# Patient Record
Sex: FEMALE | Race: WHITE | NOT HISPANIC OR LATINO | Employment: UNEMPLOYED | URBAN - METROPOLITAN AREA
[De-identification: names, ages, dates, MRNs, and addresses within clinical notes are randomized per-mention and may not be internally consistent; named-entity substitution may affect disease eponyms.]

---

## 2018-12-13 ENCOUNTER — TRANSFERRED RECORDS (OUTPATIENT)
Dept: MULTI SPECIALTY CLINIC | Facility: CLINIC | Age: 38
End: 2018-12-13

## 2018-12-13 LAB — PAP-ABSTRACT: NORMAL

## 2022-05-01 ENCOUNTER — HEALTH MAINTENANCE LETTER (OUTPATIENT)
Age: 42
End: 2022-05-01

## 2022-05-27 ENCOUNTER — MYC MEDICAL ADVICE (OUTPATIENT)
Dept: INTERNAL MEDICINE | Facility: CLINIC | Age: 42
End: 2022-05-27

## 2022-05-27 ENCOUNTER — VIRTUAL VISIT (OUTPATIENT)
Dept: INTERNAL MEDICINE | Facility: CLINIC | Age: 42
End: 2022-05-27
Payer: MEDICAID

## 2022-05-27 DIAGNOSIS — M35.00 SJOGREN'S SYNDROME WITHOUT EXTRAGLANDULAR INVOLVEMENT (H): ICD-10-CM

## 2022-05-27 DIAGNOSIS — K74.69 OTHER CIRRHOSIS OF LIVER (H): Primary | ICD-10-CM

## 2022-05-27 DIAGNOSIS — E03.9 ACQUIRED HYPOTHYROIDISM: ICD-10-CM

## 2022-05-27 DIAGNOSIS — E83.39 HYPOPHOSPHATEMIA: ICD-10-CM

## 2022-05-27 DIAGNOSIS — E83.39 HYPOPHOSPHATEMIA: Primary | ICD-10-CM

## 2022-05-27 PROCEDURE — 99203 OFFICE O/P NEW LOW 30 MIN: CPT | Mod: 95 | Performed by: INTERNAL MEDICINE
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
## 2022-05-29 ENCOUNTER — MYC MEDICAL ADVICE (OUTPATIENT)
Dept: INTERNAL MEDICINE | Facility: CLINIC | Age: 42
End: 2022-05-29
Payer: MEDICAID

## 2022-05-29 PROBLEM — K74.69 OTHER CIRRHOSIS OF LIVER (H): Status: ACTIVE | Noted: 2022-05-29

## 2022-05-29 PROBLEM — E83.39 HYPOPHOSPHATEMIA: Status: ACTIVE | Noted: 2022-05-29

## 2022-05-29 PROBLEM — E03.9 ACQUIRED HYPOTHYROIDISM: Status: ACTIVE | Noted: 2022-05-29

## 2022-05-29 RX ORDER — SODIUM PHOSPHATE, DIBASIC, ANHYDROUS, POTASSIUM PHOSPHATE, MONOBASIC, AND SODIUM PHOSPHATE, MONOBASIC, MONOHYDRATE 852; 155; 130 MG/1; MG/1; MG/1
TABLET, COATED ORAL
COMMUNITY
Start: 2022-05-29

## 2022-05-29 RX ORDER — LEVOTHYROXINE SODIUM 88 UG/1
88 TABLET ORAL DAILY
COMMUNITY
Start: 2022-05-29 | End: 2022-06-01

## 2022-05-31 ENCOUNTER — MYC MEDICAL ADVICE (OUTPATIENT)
Dept: INTERNAL MEDICINE | Facility: CLINIC | Age: 42
End: 2022-05-31
Payer: MEDICAID

## 2022-05-31 NOTE — TELEPHONE ENCOUNTER
Please call the [place] lab per the attachment.   The order is for serum phosphorus level standing order, may be done twice a week.    Diagnosis is hypophosphatemia,I CD 10 code is E83.39.   Results should be faxed to our fax of here.     If they cannot accept a request through the nurse, then please try to get a fax number where we can fax an order.

## 2022-06-01 ENCOUNTER — TELEPHONE (OUTPATIENT)
Dept: NEPHROLOGY | Facility: CLINIC | Age: 42
End: 2022-06-01
Payer: MEDICAID

## 2022-06-01 ENCOUNTER — MYC MEDICAL ADVICE (OUTPATIENT)
Dept: INTERNAL MEDICINE | Facility: CLINIC | Age: 42
End: 2022-06-01
Payer: MEDICAID

## 2022-06-01 ENCOUNTER — TELEPHONE (OUTPATIENT)
Dept: GASTROENTEROLOGY | Facility: CLINIC | Age: 42
End: 2022-06-01
Payer: MEDICAID

## 2022-06-01 DIAGNOSIS — E03.9 ACQUIRED HYPOTHYROIDISM: Primary | ICD-10-CM

## 2022-06-01 DIAGNOSIS — K74.69 OTHER CIRRHOSIS OF LIVER (H): Primary | ICD-10-CM

## 2022-06-01 DIAGNOSIS — E83.39 HYPOPHOSPHATEMIA: Primary | ICD-10-CM

## 2022-06-01 NOTE — TELEPHONE ENCOUNTER
Health Call Center    Phone Message    May a detailed message be left on voicemail: yes     Reason for Call: Order(s): Other:   Reason for requested: Labs  Date needed: ASAP  Provider name: Dr. Hill    Patient would like labs send to [facility]. She will have labs done Friday 6/3. She provided numbers to call to send   [phone]      Action Taken: Message routed to:  [facility] Neph    Travel Screening: Not Applicable

## 2022-06-01 NOTE — TELEPHONE ENCOUNTER
See TuneIn Twitter Dashboardhart message. This is listed as Historical.     No results found for: TSH     She has TSH results through the [facility].

## 2022-06-01 NOTE — TELEPHONE ENCOUNTER
Abdomen , soft, nontender, nondistended , no guarding or rigidity , no masses palpable , normal bowel sounds , Liver and Spleen , no hepatomegaly present , no hepatosplenomegaly , liver nontender , spleen not palpable Lab orders faxed to [facility], [place] -  [phone].    Mylene SHIELDS LPN  Hepatology Clinic      [facility]    Phone Message    May a detailed message be left on voicemail: yes     Reason for Call: Order(s): Other:   Reason for requested: [facility]  Date needed: 07/01/2011  Provider name: Dr. Lanny Gonzales    New patient      Action Taken: Message routed to:  [facility] hep    Travel Screening: Not Applicable

## 2022-06-01 NOTE — TELEPHONE ENCOUNTER
RECORDS RECEIVED FROM: Internal   DATE RECEIVED: 06.06.2022    NOTES STATUS DETAILS   OFFICE NOTE from referring provider Internal 05.27.2022 Betsy Lerma MD   OFFICE NOTE from other specialist  Care Everywhere 04.19.2022 Arlette Soria M.D     03.30.2022 Saumya Landa APRN, C.N.P., D.N.P.     03.07.2022 Dami Ruth M.D.     More in Epic   *Only VASCULITIS or LUPUS gather office notes for the following     *PULMONARY       *ENT     *DERMATOLOGY     *RHEUMATOLOGY     DISCHARGE SUMMARY from hospital     DISCHARGE REPORT from the ER Care Everywhere 05.31.2022 Alex Loyd M.D.   MEDICATION LIST Care Everywhere / Internal    IMAGING  (NEED IMAGES AND REPORTS)     KIDNEY CT SCAN     KIDNEY ULTRASOUND     MR ABDOMEN     NUCLEAR MEDICINE RENAL     LABS     CBC Care Everywhere 05.31.2022   CMP Care Everywhere 05.31.2022   BMP Care Everywhere 05.31.2022   UA Care Everywhere 12.28.2021   URINE PROTEIN Care Everywhere 12.28.2021   RENAL PANEL     BIOPSY     KIDNEY BIOPSY

## 2022-06-01 NOTE — TELEPHONE ENCOUNTER
See her sophiat message and Care Everywhere. She was at the [facility] ED. Had  labs from [facility].

## 2022-06-02 RX ORDER — LEVOTHYROXINE SODIUM 88 UG/1
88 TABLET ORAL DAILY
Qty: 90 TABLET | Refills: 3 | Status: SHIPPED | OUTPATIENT
Start: 2022-06-02 | End: 2023-07-07

## 2022-06-04 ASSESSMENT — PAIN SCALES - GENERAL: PAINLEVEL: NO PAIN (0)

## 2022-06-06 ENCOUNTER — VIRTUAL VISIT (OUTPATIENT)
Dept: NEPHROLOGY | Facility: CLINIC | Age: 42
End: 2022-06-06
Attending: INTERNAL MEDICINE
Payer: MEDICAID

## 2022-06-06 ENCOUNTER — PRE VISIT (OUTPATIENT)
Dept: NEPHROLOGY | Facility: CLINIC | Age: 42
End: 2022-06-06
Payer: MEDICAID

## 2022-06-06 VITALS
WEIGHT: 222 LBS | HEIGHT: 66 IN | BODY MASS INDEX: 35.68 KG/M2 | SYSTOLIC BLOOD PRESSURE: 109 MMHG | DIASTOLIC BLOOD PRESSURE: 58 MMHG

## 2022-06-06 DIAGNOSIS — E87.6 HYPOKALEMIA: ICD-10-CM

## 2022-06-06 DIAGNOSIS — R79.89 ELEVATED PTHRP LEVEL: Primary | ICD-10-CM

## 2022-06-06 DIAGNOSIS — E83.39 HYPOPHOSPHATEMIA: ICD-10-CM

## 2022-06-06 PROCEDURE — 99205 OFFICE O/P NEW HI 60 MIN: CPT | Mod: 95 | Performed by: INTERNAL MEDICINE

## 2022-06-06 PROCEDURE — G0463 HOSPITAL OUTPT CLINIC VISIT: HCPCS | Mod: PN,RTG | Performed by: INTERNAL MEDICINE

## 2022-06-06 NOTE — PROGRESS NOTES
Colleen is a 42 year old who is being evaluated via a billable video visit.      How would you like to obtain your AVS? MyChart  If the video visit is dropped, the invitation should be resent by: Text to cell phone: [phone]  Will anyone else be joining your video visit? No    Video Start Time: 1:25 PM  Video-Visit Details    Type of service:  Video Visit    Video End Time:2:30    Originating Location (pt. Location): Home    Distant Location (provider location):  [facility] [place]     Platform used for Video Visit: Headplay

## 2022-06-06 NOTE — LETTER
6/6/2022       RE: Colleen Valencia  [address] [facility] [address]     Dear Colleague,    Thank you for referring your patient, Colleen Valencia, to the [facility] [place] [facility]. Please see a copy of my visit note below.    Colleen is a 42 year old who is being evaluated via a billable video visit.      How would you like to obtain your AVS? MyChart  If the video visit is dropped, the invitation should be resent by: Text to cell phone: [phone]  Will anyone else be joining your video visit? No    Video Start Time: 1:25 PM  Video-Visit Details    Type of service:  Video Visit    Video End Time:2:30    Originating Location (pt. Location): Home    Distant Location (provider location):  [facility] [place]     Platform used for Video Visit: [facility]      Nephrology Clinic Note  June 6, 2022    I was asked to see this patient by Dr. Lerma    CC: Hypophosphatemia    HPI: Colleen Valencia is a 42 year old female with PMH significant for liver cirrhosis, hypothyroidism, hypophosphatemia, sjoegren's syndrome who presents for evaluation and management of hypophosphatemia.     Pt endorsed feeling well today. No new symptoms. She is having trouble to keep up with phosphorus. Since that time she was on supplementation. She was evaluated by nephrology in [place] and recommended to continue the supplementation. This appointment has been made as she needed to understand better about why she needs phos supplementation.     Per her, her diet vary a lot as she order a lot from out side because she is often week to cook. If she cooks, she mostly uses fresh ingredients and denied any canned or processed food.    Last year march 2021, one side of her body was numb and went to ER, found out to have low phos and started on pohs supplementation.    - History of urinary symptoms: no  - History of Hematuria: no  - Swelling: no  -  "Hx of UTIs: no  - Hx of stones: no  - Rashes/Joint pain: no  - Family hx of kidney disease: no  - NSAID use: no       Allergies   Allergen Reactions     Amoxicillin      Cefzil      Flagyl [Metronidazole Hcl]      Penicillins      Sulfa Drugs        K-Phos-Neutral 155-852-130 MG tablet, 250 total in one tablet bid up to id  levothyroxine (SYNTHROID/LEVOTHROID) 88 MCG tablet, Take 1 tablet (88 mcg) by mouth daily    No current facility-administered medications on file prior to visit.      Past Medical History:   Diagnosis Date     History of blood transfusion 2009     Liver disease     \"fatty liver\" per pt     Sjoegren syndrome      Thyroid disease        Past Surgical History:   Procedure Laterality Date     c/s x3      4 cesearean sections      SECTION      x 4     ZZC  DELIVERY ONLY      Description:  Section;  Recorded: 10/15/2013;  Comments: x4       Social History     Tobacco Use     Smoking status: Former Smoker     Quit date: 2007     Years since quitting: 15.4     Smokeless tobacco: Never Used   Substance Use Topics     Alcohol use: No       Family History   Problem Relation Age of Onset     Arthritis Mother      Osteoarthritis Sister        ROS: A 12 system review of systems was negative other than noted here or above.     Exam:  /58   Ht 1.676 m (5' 6\")   Wt 100.7 kg (222 lb)   BMI 35.83 kg/m      GENERAL APPEARANCE: alert and no distress  EYES:  no scleral icterus  RESP: able to speak in full sentences, not in respiratory distress  CV: denied edema  SKIN: no visible facial rash  NEURO: mentation intact and speech normal  PSYCH: affect normal/bright    Results:    No visits with results within 1 Day(s) from this visit.   Latest known visit with results is:   Office Visit - [facility] on 10/30/2015   Component Date Value Ref Range Status     C4 Complement 10/30/2015 14 (A) 19 - 59 mg/dL Final     C3 Complement 10/30/2015 122  83 - 177 mg/dL Final     Complement, Total, S " 10/30/2015 63  30 - 75 U/mL Final    Comment:    Test Performed by:  [facility] - [address], [place] [address]  : Tanner Live II, M.D., Ph.D.       Immunoglobulin G 10/30/2015 1,653  700 - 1,700 mg/dL Final     Immunoglobulin M 10/30/2015 130  60 - 280 mg/dL Final     Immunoglobulin A 10/30/2015 309  65 - 400 mg/dL Final     DNA (ds) Antibody 10/30/2015 63 (A) <25 IU Final       Assessment/Plan:     Hypophosphatemia  Hypokalemia   Pt noted to have low phos in 2021 and since that time she was placed on replacement. Based on her diet history, was not able to pin out that it is decreased oral intake. She was hospitalized and got 24 hour urine excretion of phosphorus but collection was messed up so the values may not be accurate. Per pt, with regular diet but with out supplementation, the phos level use to go down quickly and eventually they had to keep her on supplementation to raise the level. No significant GI losses. Her PTH been elevated for quite some time now, not too high but above normal while susan levels are normal.  - will get US of parathyroid glands to rule out primary hyperparathyroidism which might be causing renal phosphate waste   - meanwhile recommended to continue on supplementation.  - will consider repeating 24 hour phos excretion if US is negative    Total time spent on the day of clinic visit was 70 min including 55 min of face to face time with pt, chart review, lab and image review, care every where lab review, documentation as above.    Lucero Hill  Dept of Nephrology and Hypertension  [facility]        Again, thank you for allowing me to participate in the care of your patient.      Sincerely,    Lucero Hill MD

## 2022-06-06 NOTE — PROGRESS NOTES
"  Nephrology Clinic Note  2022    I was asked to see this patient by Dr. Lerma    CC: Hypophosphatemia    HPI: Colleen Valencia is a 42 year old female with PMH significant for liver cirrhosis, hypothyroidism, hypophosphatemia, sjoegren's syndrome who presents for evaluation and management of hypophosphatemia.     Pt endorsed feeling well today. No new symptoms. She is having trouble to keep up with phosphorus. Since that time she was on supplementation. She was evaluated by nephrology in [place] and recommended to continue the supplementation. This appointment has been made as she needed to understand better about why she needs phos supplementation.     Per her, her diet vary a lot as she order a lot from out side because she is often week to cook. If she cooks, she mostly uses fresh ingredients and denied any canned or processed food.    Last year 2021, one side of her body was numb and went to ER, found out to have low phos and started on pohs supplementation.    - History of urinary symptoms: no  - History of Hematuria: no  - Swelling: no  - Hx of UTIs: no  - Hx of stones: no  - Rashes/Joint pain: no  - Family hx of kidney disease: no  - NSAID use: no       Allergies   Allergen Reactions     Amoxicillin      Cefzil      Flagyl [Metronidazole Hcl]      Penicillins      Sulfa Drugs        K-Phos-Neutral 155-852-130 MG tablet, 250 total in one tablet bid up to id  levothyroxine (SYNTHROID/LEVOTHROID) 88 MCG tablet, Take 1 tablet (88 mcg) by mouth daily    No current facility-administered medications on file prior to visit.      Past Medical History:   Diagnosis Date     History of blood transfusion      Liver disease     \"fatty liver\" per pt     Sjoegren syndrome      Thyroid disease        Past Surgical History:   Procedure Laterality Date     c/s x3      4 cesearean sections      SECTION      x 4     ZZC  DELIVERY ONLY      Description:  Section;  Recorded: 10/15/2013;  " "Comments: x4       Social History     Tobacco Use     Smoking status: Former Smoker     Quit date: 1/1/2007     Years since quitting: 15.4     Smokeless tobacco: Never Used   Substance Use Topics     Alcohol use: No       Family History   Problem Relation Age of Onset     Arthritis Mother      Osteoarthritis Sister        ROS: A 12 system review of systems was negative other than noted here or above.     Exam:  /58   Ht 1.676 m (5' 6\")   Wt 100.7 kg (222 lb)   BMI 35.83 kg/m      GENERAL APPEARANCE: alert and no distress  EYES:  no scleral icterus  RESP: able to speak in full sentences, not in respiratory distress  CV: denied edema  SKIN: no visible facial rash  NEURO: mentation intact and speech normal  PSYCH: affect normal/bright    Results:    No visits with results within 1 Day(s) from this visit.   Latest known visit with results is:   Office Visit - [facility] on 10/30/2015   Component Date Value Ref Range Status     C4 Complement 10/30/2015 14 (A) 19 - 59 mg/dL Final     C3 Complement 10/30/2015 122  83 - 177 mg/dL Final     Complement, Total, S 10/30/2015 63  30 - 75 U/mL Final    Comment:    Test Performed by:  [facility] [place] [address]  : Tanner Live II, M.D., Ph.D.       Immunoglobulin G 10/30/2015 1,653  700 - 1,700 mg/dL Final     Immunoglobulin M 10/30/2015 130  60 - 280 mg/dL Final     Immunoglobulin A 10/30/2015 309  65 - 400 mg/dL Final     DNA (ds) Antibody 10/30/2015 63 (A) <25 IU Final       Assessment/Plan:     Hypophosphatemia  Hypokalemia   Pt noted to have low phos in 2021 and since that time she was placed on replacement. Based on her diet history, was not able to pin out that it is decreased oral intake. She was hospitalized and got 24 hour urine excretion of phosphorus but collection was messed up so the values may not be accurate. Per pt, with regular diet but with out supplementation, the " phos level use to go down quickly and eventually they had to keep her on supplementation to raise the level. No significant GI losses. Her PTH been elevated for quite some time now, not too high but above normal while susan levels are normal.  - will get US of parathyroid glands to rule out primary hyperparathyroidism which might be causing renal phosphate waste   - meanwhile recommended to continue on supplementation.  - will consider repeating 24 hour phos excretion if US is negative    Total time spent on the day of clinic visit was 70 min including 55 min of face to face time with pt, chart review, lab and image review, care every where lab review, documentation as above.    Lucero Hill  Dept of Nephrology and Hypertension  [facility]

## 2022-06-07 NOTE — TELEPHONE ENCOUNTER
Call to lab. They are able to take an order over the phone and then would also need order faxed. Unable to only fax orders. States they do not do standing orders. Patient could have this done multiple times within the next 3 months but it would need to be scheduled. Please clarify how this is to be ordered.

## 2022-06-23 NOTE — TELEPHONE ENCOUNTER
RECORDS RECEIVED FROM: Internal   Appt Date: 07.08.2022   NOTES STATUS DETAILS   OFFICE NOTE from referring provider Internal 05.27.2022 Betsy Lerma MD   OFFICE NOTES from other specialists Care Everywhere 04.05.2022 Arlette Soria M.D.    03.08.2022 Yo Devine M.D.     DISCHARGE SUMMARY from hospital     MEDICATION LIST Internal / CE    LIVER BIOSPY (IF APPLICABLE)      PATHOLOGY REPORTS      IMAGING     ENDOSCOPY (IF AVAILABLE)     COLONOSCOPY (IF AVAILABLE)     ULTRASOUND LIVER     CT OF ABDOMEN     MRI OF LIVER     FIBROSCAN, US ELASTOGRAPHY, FIBROSIS SCAN, MR ELASTOGRAPHY     LABS     HEPATIC PANEL (LIVER PANEL) Care Everywhere 09.10.2021   BASIC METABOLIC PANEL Care Everywhere 05.31.2022   COMPLETE METABOLIC PANEL Care Everywhere 06.03.2022   COMPLETE BLOOD COUNT (CBC) Care Everywhere 06.03.2022   INTERNATIONAL NORMALIZED RATIO (INR)     HEPATITIS C ANTIBODY     HEPATITIS C VIRAL LOAD/PCR     HEPATITIS C GENOTYPE     HEPATITIS B SURFACE ANTIGEN Care Everywhere 11.09.2021   HEPATITIS B SURFACE ANTIBODY     HEPATITIS B DNA QUANT LEVEL     HEPATITIS B CORE ANTIBODY       Action 06.23.2022 RM   Action Taken

## 2022-07-01 ENCOUNTER — MYC MEDICAL ADVICE (OUTPATIENT)
Dept: INTERNAL MEDICINE | Facility: CLINIC | Age: 42
End: 2022-07-01

## 2022-07-07 NOTE — PROGRESS NOTES
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 Well compensated  Discussed NAFLD and fibrosis.  With weight loss, there is some ability to slow the progression for some patients. In addition, some fibrosis can regress in some patients.    Bone health Due for DEXA   HCC screening Due to US.   Variceal screening Recommendation reviewed.   She will pursue these through [facility] and let us know if we need to get an order there.    Labs and US every 6 mo. Return yearly.    Lanny Gonzales MD  Hepatology/Liver Transplant  Medical Director, Liver Transplantation  [facility]    Subjective  Colleen Valencia is a 42 Y F referred for NAFLD. Notes state she has a history of heavy drinking but she says this is not accurate. She drank during breaks in college but not otherwise. No ETOH for 20 y She has previously had care at [facility]. She has not had a liver biopsy. MRE showed fat fraction 60% with stage 3-4 fibrosis. MELD has been low at 7.    Labs 6/3/22 reviewed and normal.    HCV neg 2021  HBV neg 2021    HCC screening US 7/22/21  Variceal screening patient has declined  Iron panel nl    Risk factors for fatty liver disease: BMI 37, HLD, pre-diabetes  ETOH none for over 20 years  Past Medical History:   Diagnosis Date     History of blood transfusion 2009     Liver disease     \"fatty liver\" per pt     Sjoegren syndrome      Thyroid disease    Hypophosphatemia unknown cause and takes \[facility]\""   Social History     Socioeconomic History     Marital status:      Spouse name: Not on file     Number of children: Not on file     Years of education: Not on file     Highest education level: Not on file   Occupational History     Not on file   Tobacco Use     Smoking status: Former Smoker     Quit date: 1/1/2007     Years since quitting: 15.5     Smokeless tobacco: Never Used   Substance and Sexual Activity     Alcohol use: No     " Drug use: Not on file     Sexual activity: Not on file   Other Topics Concern     Not on file   Social History Narrative     Not on file     Social Determinants of Health     Financial Resource Strain: Not on file   Food Insecurity: Not on file   Transportation Needs: Not on file   Physical Activity: Not on file   Stress: Not on file   Social Connections: Not on file   Intimate Partner Violence: Not on file   Housing Stability: Not on file   4 children at home.     Family History   Problem Relation Age of Onset     Arthritis Mother      Osteoarthritis Sister    Dtr had fatty liver, She  in  in MVA.  Son has alopecisa. 13 in   ROS 10 point ROS neg other than the symptoms noted above in the HPI.  Exam  Gen Alert pleasant NAD  Resp No difficulty breathing. No cough  Skin No Jaundice  Eyes No icterus  Neuro RAMOS  MSK no muscle wasting  Psyche Pleasant, appropriate. Well groomed.  Colleen Valencia is a 42 year old female who is being evaluated via a billable video visit.    Video-Visit Details  Type of service:  Video Visit  Video Start Time: 1225  Video End Time: 1245  Originating Location (pt. Location):home  Distant Location (provider location):  [facility] [place]      Platform used for Video Visit: [employer] or [employer]

## 2022-07-08 ENCOUNTER — PRE VISIT (OUTPATIENT)
Dept: GASTROENTEROLOGY | Facility: CLINIC | Age: 42
End: 2022-07-08

## 2022-07-08 ENCOUNTER — VIRTUAL VISIT (OUTPATIENT)
Dept: GASTROENTEROLOGY | Facility: CLINIC | Age: 42
End: 2022-07-08
Attending: INTERNAL MEDICINE
Payer: MEDICAID

## 2022-07-08 DIAGNOSIS — K76.0 NAFLD (NONALCOHOLIC FATTY LIVER DISEASE): Primary | ICD-10-CM

## 2022-07-08 PROCEDURE — G0463 HOSPITAL OUTPT CLINIC VISIT: HCPCS | Mod: PN,RTG | Performed by: INTERNAL MEDICINE

## 2022-07-08 PROCEDURE — 99204 OFFICE O/P NEW MOD 45 MIN: CPT | Mod: 95 | Performed by: INTERNAL MEDICINE

## 2022-07-08 NOTE — LETTER
"    7/8/2022         RE: Colleen Valencia  [address] [facility] [address]        Dear Colleague,    Thank you for referring your patient, Colleen Valencia, to the [facility]PAT[facility] [place]. Please see a copy of my visit note below.    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 Well compensated  Discussed NAFLD and fibrosis.  With weight loss, there is some ability to slow the progression for some patients. In addition, some fibrosis can regress in some patients.    Bone health Due for DEXA   HCC screening Due to US.   Variceal screening Recommendation reviewed.   She will pursue these through [facility] and let us know if we need to get an order there.    Labs and US every 6 mo. Return yearly.    Lanny Gonzales MD  Hepatology/Liver Transplant  Medical Director, Liver Transplantation  [facility]  Colleen Valencia is a 42 Y F referred for NAFLD. Notes state she has a history of heavy drinking but she says this is not accurate. She drank during breaks in college but not otherwise. No ETOH for 20 y She has previously had care at [facility]. She has not had a liver biopsy. MRE showed fat fraction 60% with stage 3-4 fibrosis. MELD has been low at 7.    Labs 6/3/22 reviewed and normal.    HCV neg 2021  HBV neg 2021    HCC screening US 7/22/21  Variceal screening patient has declined  Iron panel nl    Risk factors for fatty liver disease: BMI 37, HLD, pre-diabetes  ETOH none for over 20 years  Past Medical History:   Diagnosis Date     History of blood transfusion 2009     Liver disease     \"fatty liver\" per pt     Sjoegren syndrome      Thyroid disease    Hypophosphatemia unknown cause and takes [facility]   Social History     Socioeconomic History     Marital status:      Spouse name: Not on file     Number of children: Not on file     Years of education: Not on file     Highest " education level: Not on file   Occupational History     Not on file   Tobacco Use     Smoking status: Former Smoker     Quit date: 2007     Years since quitting: 15.5     Smokeless tobacco: Never Used   Substance and Sexual Activity     Alcohol use: No     Drug use: Not on file     Sexual activity: Not on file   Other Topics Concern     Not on file   Social History Narrative     Not on file     Social Determinants of Health     Financial Resource Strain: Not on file   Food Insecurity: Not on file   Transportation Needs: Not on file   Physical Activity: Not on file   Stress: Not on file   Social Connections: Not on file   Intimate Partner Violence: Not on file   Housing Stability: Not on file   4 children at home.     Family History   Problem Relation Age of Onset     Arthritis Mother      Osteoarthritis Sister    Dtr had fatty liver, She  in  in MVA.  Son has alopecisa. 13 in   ROS 10 point ROS neg other than the symptoms noted above in the HPI.  Exam  Gen Alert pleasant NAD  Resp No difficulty breathing. No cough  Skin No Jaundice  Eyes No icterus  Neuro RAMOS  MSK no muscle wasting  Psyche Pleasant, appropriate. Well groomed.  Colleen Valencia is a 42 year old female who is being evaluated via a billable video visit.    Video-Visit Details  Type of service:  Video Visit  Video Start Time: 1225  Video End Time: 1245  Originating Location (pt. Location):home  Distant Location (provider location):  [facility]PAT[facility] [place]      Platform used for Video Visit: "[employer] or [employer]        Again, thank you for allowing me to participate in the care of your patient.        Sincerely,        Lanny Gonzales MD

## 2022-07-25 ENCOUNTER — TELEPHONE (OUTPATIENT)
Dept: GASTROENTEROLOGY | Facility: CLINIC | Age: 42
End: 2022-07-25

## 2022-07-25 DIAGNOSIS — K74.69 OTHER CIRRHOSIS OF LIVER (H): ICD-10-CM

## 2022-07-25 DIAGNOSIS — K76.0 NAFLD (NONALCOHOLIC FATTY LIVER DISEASE): Primary | ICD-10-CM

## 2022-07-25 NOTE — TELEPHONE ENCOUNTER
Returned patient's call. Let her know correction to video visit appointment will be made.  Orders for abdominal ultrasound and DEXA scan placed.  Patient will call and schedule.    No further questions or concerns.    [facility]    Phone Message    May a detailed message be left on voicemail: yes     Reason for Call: Other:     Pt is calling to ask that a correction be made to file, she stated that she did have a video visit on 7/7/2022 rather than on 7/8/2022.    She is also asking for orders for the ultrasound that was discusses in that visit.    Please call to discuss.    Action Taken: Message routed to:  [facility] hep    Travel Screening: Not Applicable

## 2022-08-12 ENCOUNTER — HOSPITAL ENCOUNTER (OUTPATIENT)
Dept: ULTRASOUND IMAGING | Facility: CLINIC | Age: 42
Discharge: HOME | End: 2022-08-12
Attending: INTERNAL MEDICINE
Payer: MEDICAID

## 2022-08-12 DIAGNOSIS — E83.39 HYPOPHOSPHATEMIA: ICD-10-CM

## 2022-08-12 DIAGNOSIS — K74.69 OTHER CIRRHOSIS OF LIVER (H): ICD-10-CM

## 2022-08-12 DIAGNOSIS — K76.0 NAFLD (NONALCOHOLIC FATTY LIVER DISEASE): ICD-10-CM

## 2022-08-12 PROCEDURE — 76536 US EXAM OF HEAD AND NECK: CPT

## 2022-08-12 PROCEDURE — 76705 ECHO EXAM OF ABDOMEN: CPT

## 2022-08-15 DIAGNOSIS — K74.69 OTHER CIRRHOSIS OF LIVER (H): ICD-10-CM

## 2022-08-15 DIAGNOSIS — K76.0 NAFLD (NONALCOHOLIC FATTY LIVER DISEASE): Primary | ICD-10-CM

## 2022-09-08 DIAGNOSIS — E87.6 HYPOKALEMIA: Primary | ICD-10-CM

## 2022-09-08 DIAGNOSIS — E83.39 HYPOPHOSPHATEMIA: ICD-10-CM

## 2022-09-09 ENCOUNTER — TELEPHONE (OUTPATIENT)
Dept: NEPHROLOGY | Facility: CLINIC | Age: 42
End: 2022-09-09

## 2022-09-09 NOTE — TELEPHONE ENCOUNTER
[facility]    Phone Message    May a detailed message be left on voicemail: yes     Reason for Call: Ronnie would like to speak with clinic in regards to a labs referral sent to them. Please reach out to Ronnie.    Action Taken: Message routed to:  [facility] Nephrology    Travel Screening: Not Applicable

## 2022-09-09 NOTE — TELEPHONE ENCOUNTER
Call to Ronnie. Clarification on CBC with platelets (no diff) and urine protein with creatinine ratio  Theodora Garvin LPN  Nephrology  [phone]

## 2022-09-12 ENCOUNTER — VIRTUAL VISIT (OUTPATIENT)
Dept: NEPHROLOGY | Facility: CLINIC | Age: 42
End: 2022-09-12
Attending: INTERNAL MEDICINE
Payer: MEDICAID

## 2022-09-12 DIAGNOSIS — E21.3 HYPERPARATHYROIDISM (H): Primary | ICD-10-CM

## 2022-09-12 DIAGNOSIS — R53.83 OTHER FATIGUE: ICD-10-CM

## 2022-09-12 DIAGNOSIS — E83.39 HYPOPHOSPHATEMIA: ICD-10-CM

## 2022-09-12 DIAGNOSIS — E87.6 HYPOKALEMIA: ICD-10-CM

## 2022-09-12 PROCEDURE — G0463 HOSPITAL OUTPT CLINIC VISIT: HCPCS | Mod: PN,RTG | Performed by: INTERNAL MEDICINE

## 2022-09-12 PROCEDURE — 99214 OFFICE O/P EST MOD 30 MIN: CPT | Mod: 95 | Performed by: INTERNAL MEDICINE

## 2022-09-12 NOTE — PROGRESS NOTES
Colleen is a 42 year old who is being evaluated via a billable video visit.      How would you like to obtain your AVS? MyChart  If the video visit is dropped, the invitation should be resent by: Text to cell phone: [phone]  Will anyone else be joining your video visit? No        Video-Visit Details    Video Start Time: 12:58 PM    Type of service:  Video Visit    Video End Time:1:18 PM    Originating Location (pt. Location): Home    Distant Location (provider location):  [facility]PHROLOG[facility] [place]     Platform used for Video Visit: [facility]       Nephrology Clinic Note  September 12, 2022    I was asked to see this patient by Dr. Lerma     CC: Hypophosphatemia     HPI: Colleen Valencia is a 42 year old female with PMH significant for liver cirrhosis, hypothyroidism, hypophosphatemia, sjoegren's syndrome who presents for evaluation and management of hypophosphatemia.     Pt presented to video visit for electrolyte management. Pt endorsed that, she tried titrating down her doses of electrolyte supplementation for a week and noted her symptoms including fatigue was profound after a week of discontinuation of her supplementation. She restarted her supplements with improvement in her symptoms. Denied other systemic symptoms today.    Pt endorsed that, she had her labs, unfortunately they were not resulted in care every where records.    - History of urinary symptoms: no  - History of Hematuria: no  - Swelling: no  - Hx of UTIs: no  - Hx of stones: no  - Rashes/Joint pain: no  - Family hx of kidney disease: no  - NSAID use: no        Allergies   Allergen Reactions     Amoxicillin      Cefzil      Flagyl [Metronidazole Hcl]      Penicillins      Sulfa Drugs        K-Phos-Neutral 155-852-130 MG tablet, 250 total in one tablet bid up to id  levothyroxine (SYNTHROID/LEVOTHROID) 88 MCG tablet, Take 1 tablet (88 mcg) by mouth daily    No current facility-administered medications on file prior to  "visit.      Past Medical History:   Diagnosis Date     History of blood transfusion 2009     Liver disease     \"fatty liver\" per pt     Sjoegren syndrome      Thyroid disease        Past Surgical History:   Procedure Laterality Date     c/s x3      4 cesearean sections      SECTION      x 4     ZZC  DELIVERY ONLY      Description:  Section;  Recorded: 10/15/2013;  Comments: x4       Social History     Tobacco Use     Smoking status: Former Smoker     Quit date: 2007     Years since quitting: 15.7     Smokeless tobacco: Never Used   Substance Use Topics     Alcohol use: No       Family History   Problem Relation Age of Onset     Arthritis Mother      Osteoarthritis Sister        ROS: A 12 system review of systems was negative other than noted here or above.     Exam:  There were no vitals taken for this visit.    GENERAL APPEARANCE: alert and no distress  EYES:  no scleral icterus  HENT: no gross abnormality noted  RESP: able to speak in full sentences, not in respiratory distress, no audible wheezing noted   CV: denied edema  SKIN: no visible facial rash  NEURO: mentation intact and speech normal  PSYCH: affect normal/bright      Results:    No visits with results within 1 Day(s) from this visit.   Latest known visit with results is:   Office Visit - [facility] on 10/30/2015   Component Date Value Ref Range Status     C4 Complement 10/30/2015 14 (A) 19 - 59 mg/dL Final     C3 Complement 10/30/2015 122  83 - 177 mg/dL Final     Complement, Total, S 10/30/2015 63  30 - 75 U/mL Final    Comment:    Test Performed by:  [place] [address]  : Tanner Live II, M.D., Ph.D.       Immunoglobulin G 10/30/2015 1,653  700 - 1,700 mg/dL Final     Immunoglobulin M 10/30/2015 130  60 - 280 mg/dL Final     Immunoglobulin A 10/30/2015 309  65 - 400 mg/dL Final     DNA (ds) Antibody 10/30/2015 63 (A) <25 IU " Final       Assessment/Plan:     Hypophosphatemia  Hypokalemia   Pt noted to have low phos in 2021 and since that time she was placed on replacement. Based on her diet history, was not able to pin out that it is decreased oral intake. She was hospitalized and got 24 hour urine excretion of phosphorus but collection was messed up so the values may not be accurate. Per pt, with regular diet but with out supplementation, the phos level use to go down quickly and eventually they had to keep her on supplementation to raise the level. No significant GI losses. Her PTH been elevated for quite some time now, not too high but above normal while susan levels are normal.  - parathyroid US with out adenoma or hypertrophy   - Recommended to continue on supplementation with out breakage.  - will consider repeating 24 hour phos excretion during next visit.     Total time spent on the day of clinic visit was 30 min including 20 min of face to face time with pt, chart review, lab and image review, care every where lab review, documentation as above.     Lucero Hill  Dept of Nephrology and Hypertension  [facility]

## 2022-09-12 NOTE — LETTER
"9/12/2022       RE: Colleen Valencia  [address] [facility] [address]     Dear Colleague,    Thank you for referring your patient, Colleen Valencia, to the [facility] [place] at [facility]. Please see a copy of my visit note below.      Nephrology Clinic Note  September 12, 2022    I was asked to see this patient by Dr. Lerma     CC: Hypophosphatemia     HPI: Colleen Valencia is a 42 year old female with PMH significant for liver cirrhosis, hypothyroidism, hypophosphatemia, sjoegren's syndrome who presents for evaluation and management of hypophosphatemia.     Pt presented to video visit for electrolyte management. Pt endorsed that, she tried titrating down her doses of electrolyte supplementation for a week and noted her symptoms including fatigue was profound after a week of discontinuation of her supplementation. She restarted her supplements with improvement in her symptoms. Denied other systemic symptoms today.    Pt endorsed that, she had her labs, unfortunately they were not resulted in care every where records.    - History of urinary symptoms: no  - History of Hematuria: no  - Swelling: no  - Hx of UTIs: no  - Hx of stones: no  - Rashes/Joint pain: no  - Family hx of kidney disease: no  - NSAID use: no        Allergies   Allergen Reactions     Amoxicillin      Cefzil      Flagyl [Metronidazole Hcl]      Penicillins      Sulfa Drugs        K-Phos-Neutral 155-852-130 MG tablet, 250 total in one tablet bid up to id  levothyroxine (SYNTHROID/LEVOTHROID) 88 MCG tablet, Take 1 tablet (88 mcg) by mouth daily    No current facility-administered medications on file prior to visit.      Past Medical History:   Diagnosis Date     History of blood transfusion 2009     Liver disease     \"fatty liver\" per pt     Sjoegren syndrome      Thyroid disease        Past Surgical History:   Procedure Laterality Date     c/s x3      4 cesearean sections "      SECTION      x 4     ZZC  DELIVERY ONLY      Description:  Section;  Recorded: 10/15/2013;  Comments: x4       Social History     Tobacco Use     Smoking status: Former Smoker     Quit date: 2007     Years since quitting: 15.7     Smokeless tobacco: Never Used   Substance Use Topics     Alcohol use: No       Family History   Problem Relation Age of Onset     Arthritis Mother      Osteoarthritis Sister        ROS: A 12 system review of systems was negative other than noted here or above.     Exam:  There were no vitals taken for this visit.    GENERAL APPEARANCE: alert and no distress  EYES:  no scleral icterus  HENT: no gross abnormality noted  RESP: able to speak in full sentences, not in respiratory distress, no audible wheezing noted   CV: denied edema  SKIN: no visible facial rash  NEURO: mentation intact and speech normal  PSYCH: affect normal/bright      Results:    No visits with results within 1 Day(s) from this visit.   Latest known visit with results is:   Office Visit - [facility] on 10/30/2015   Component Date Value Ref Range Status     C4 Complement 10/30/2015 14 (A) 19 - 59 mg/dL Final     C3 Complement 10/30/2015 122  83 - 177 mg/dL Final     Complement, Total, S 10/30/2015 63  30 - 75 U/mL Final    Comment:    Test Performed by:  [facility] [place] [address]  : Tanner Live II, M.D., Ph.D.       Immunoglobulin G 10/30/2015 1,653  700 - 1,700 mg/dL Final     Immunoglobulin M 10/30/2015 130  60 - 280 mg/dL Final     Immunoglobulin A 10/30/2015 309  65 - 400 mg/dL Final     DNA (ds) Antibody 10/30/2015 63 (A) <25 IU Final       Assessment/Plan:     Hypophosphatemia  Hypokalemia   Pt noted to have low phos in  and since that time she was placed on replacement. Based on her diet history, was not able to pin out that it is decreased oral intake. She was hospitalized and got 24 hour  urine excretion of phosphorus but collection was messed up so the values may not be accurate. Per pt, with regular diet but with out supplementation, the phos level use to go down quickly and eventually they had to keep her on supplementation to raise the level. No significant GI losses. Her PTH been elevated for quite some time now, not too high but above normal while susan levels are normal.  - parathyroid US with out adenoma or hypertrophy   - Recommended to continue on supplementation with out breakage.  - will consider repeating 24 hour phos excretion during next visit.     Total time spent on the day of clinic visit was 30 min including 20 min of face to face time with pt, chart review, lab and image review, care every where lab review, documentation as above.     Lucero Hill  Dept of Nephrology and Hypertension  [facility]

## 2022-09-16 ENCOUNTER — TELEPHONE (OUTPATIENT)
Dept: MULTI SPECIALTY CLINIC | Facility: CLINIC | Age: 42
End: 2022-09-16

## 2022-12-25 ENCOUNTER — HEALTH MAINTENANCE LETTER (OUTPATIENT)
Age: 42
End: 2022-12-25

## 2023-06-02 ENCOUNTER — HEALTH MAINTENANCE LETTER (OUTPATIENT)
Age: 43
End: 2023-06-02

## 2023-07-05 ENCOUNTER — OFFICE VISIT (OUTPATIENT)
Dept: FAMILY MEDICINE | Facility: CLINIC | Age: 43
End: 2023-07-05
Payer: MEDICAID

## 2023-07-05 VITALS
RESPIRATION RATE: 21 BRPM | BODY MASS INDEX: 33.59 KG/M2 | TEMPERATURE: 97.7 F | HEART RATE: 68 BPM | OXYGEN SATURATION: 99 % | SYSTOLIC BLOOD PRESSURE: 120 MMHG | HEIGHT: 66 IN | DIASTOLIC BLOOD PRESSURE: 82 MMHG | WEIGHT: 209 LBS

## 2023-07-05 DIAGNOSIS — K74.02 HEPATIC FIBROSIS, ADVANCED FIBROSIS: ICD-10-CM

## 2023-07-05 DIAGNOSIS — E03.9 ACQUIRED HYPOTHYROIDISM: ICD-10-CM

## 2023-07-05 DIAGNOSIS — K02.9 DENTAL CARIES: ICD-10-CM

## 2023-07-05 DIAGNOSIS — E83.39 HYPOPHOSPHATEMIA: Primary | ICD-10-CM

## 2023-07-05 LAB
ALBUMIN SERPL BCG-MCNC: 4.3 G/DL (ref 3.5–5.2)
ALP SERPL-CCNC: 104 U/L (ref 35–104)
ALT SERPL W P-5'-P-CCNC: 23 U/L (ref 0–50)
ANION GAP SERPL CALCULATED.3IONS-SCNC: 11 MMOL/L (ref 7–15)
AST SERPL W P-5'-P-CCNC: 56 U/L (ref 0–45)
BILIRUB DIRECT SERPL-MCNC: <0.2 MG/DL (ref 0–0.3)
BILIRUB SERPL-MCNC: 0.3 MG/DL
BUN SERPL-MCNC: 8.2 MG/DL (ref 6–20)
CALCIUM SERPL-MCNC: 9.3 MG/DL (ref 8.6–10)
CHLORIDE SERPL-SCNC: 104 MMOL/L (ref 98–107)
CREAT SERPL-MCNC: 0.71 MG/DL (ref 0.51–0.95)
DEPRECATED CALCIDIOL+CALCIFEROL SERPL-MC: 26 UG/L (ref 20–75)
DEPRECATED HCO3 PLAS-SCNC: 25 MMOL/L (ref 22–29)
ERYTHROCYTE [DISTWIDTH] IN BLOOD BY AUTOMATED COUNT: 15.3 % (ref 10–15)
FERRITIN SERPL-MCNC: 15 NG/ML (ref 6–175)
GFR SERPL CREATININE-BSD FRML MDRD: >90 ML/MIN/1.73M2
GLUCOSE SERPL-MCNC: 126 MG/DL (ref 70–99)
HCT VFR BLD AUTO: 41.2 % (ref 35–47)
HGB BLD-MCNC: 12.9 G/DL (ref 11.7–15.7)
IRON BINDING CAPACITY (ROCHE): 391 UG/DL (ref 240–430)
IRON SATN MFR SERPL: 10 % (ref 15–46)
IRON SERPL-MCNC: 41 UG/DL (ref 37–145)
MAGNESIUM SERPL-MCNC: 2.1 MG/DL (ref 1.7–2.3)
MCH RBC QN AUTO: 25.5 PG (ref 26.5–33)
MCHC RBC AUTO-ENTMCNC: 31.3 G/DL (ref 31.5–36.5)
MCV RBC AUTO: 82 FL (ref 78–100)
PHOSPHATE SERPL-MCNC: 2 MG/DL (ref 2.5–4.5)
PLATELET # BLD AUTO: 178 10E3/UL (ref 150–450)
POTASSIUM SERPL-SCNC: 3.6 MMOL/L (ref 3.4–5.3)
PROT SERPL-MCNC: 7.8 G/DL (ref 6.4–8.3)
PTH-INTACT SERPL-MCNC: 54 PG/ML (ref 15–65)
RBC # BLD AUTO: 5.05 10E6/UL (ref 3.8–5.2)
SODIUM SERPL-SCNC: 140 MMOL/L (ref 136–145)
TSH SERPL DL<=0.005 MIU/L-ACNC: 1.6 UIU/ML (ref 0.3–4.2)
VIT B12 SERPL-MCNC: 491 PG/ML (ref 232–1245)
WBC # BLD AUTO: 3.8 10E3/UL (ref 4–11)

## 2023-07-05 PROCEDURE — 99204 OFFICE O/P NEW MOD 45 MIN: CPT | Performed by: FAMILY MEDICINE

## 2023-07-05 PROCEDURE — 82248 BILIRUBIN DIRECT: CPT | Performed by: FAMILY MEDICINE

## 2023-07-05 PROCEDURE — 83550 IRON BINDING TEST: CPT | Performed by: FAMILY MEDICINE

## 2023-07-05 PROCEDURE — 83735 ASSAY OF MAGNESIUM: CPT | Performed by: FAMILY MEDICINE

## 2023-07-05 PROCEDURE — 36415 COLL VENOUS BLD VENIPUNCTURE: CPT | Performed by: FAMILY MEDICINE

## 2023-07-05 PROCEDURE — 82607 VITAMIN B-12: CPT | Performed by: FAMILY MEDICINE

## 2023-07-05 PROCEDURE — 82306 VITAMIN D 25 HYDROXY: CPT | Performed by: FAMILY MEDICINE

## 2023-07-05 PROCEDURE — 82728 ASSAY OF FERRITIN: CPT | Performed by: FAMILY MEDICINE

## 2023-07-05 PROCEDURE — 83540 ASSAY OF IRON: CPT | Performed by: FAMILY MEDICINE

## 2023-07-05 PROCEDURE — 84100 ASSAY OF PHOSPHORUS: CPT | Performed by: FAMILY MEDICINE

## 2023-07-05 PROCEDURE — 83970 ASSAY OF PARATHORMONE: CPT | Performed by: FAMILY MEDICINE

## 2023-07-05 PROCEDURE — 80053 COMPREHEN METABOLIC PANEL: CPT | Performed by: FAMILY MEDICINE

## 2023-07-05 PROCEDURE — 85027 COMPLETE CBC AUTOMATED: CPT | Performed by: FAMILY MEDICINE

## 2023-07-05 PROCEDURE — 84443 ASSAY THYROID STIM HORMONE: CPT | Performed by: FAMILY MEDICINE

## 2023-07-05 RX ORDER — CHLORHEXIDINE GLUCONATE ORAL RINSE 1.2 MG/ML
15 SOLUTION DENTAL 2 TIMES DAILY
Qty: 300 ML | Refills: 0 | Status: SHIPPED | OUTPATIENT
Start: 2023-07-05 | End: 2023-07-15

## 2023-07-05 RX ORDER — ALBUTEROL SULFATE 90 UG/1
2 AEROSOL, METERED RESPIRATORY (INHALATION)
COMMUNITY
Start: 2021-10-28

## 2023-07-05 RX ORDER — ASCORBIC ACID 500 MG
500 TABLET ORAL
COMMUNITY

## 2023-07-05 RX ORDER — CHLORHEXIDINE GLUCONATE ORAL RINSE 1.2 MG/ML
SOLUTION DENTAL
COMMUNITY
Start: 2023-02-26 | End: 2023-07-05

## 2023-07-05 RX ORDER — FERROUS SULFATE 325(65) MG
325 TABLET ORAL
COMMUNITY

## 2023-07-05 ASSESSMENT — ENCOUNTER SYMPTOMS: NUMBNESS: 1

## 2023-07-05 NOTE — Clinical Note
Please abstract the following data from this visit with this patient into the appropriate field in Epic:  Tests that can be patient reported without a hard copy:  Pap smear done on this date: 12/13/2018 (approximately), by this group: [facility], results were see below.    EXT ThinPrep w/HPV Co-Test Screen Specimen:  Thin Prep Vial - Cervix/Endocervix  Ref Range & Units 4 yr ago Comments EXT ThinPrep w/HPV Co-Test Screen Normal - See Scanned Report for Details, HIMS - Report Received and Scanned Normal - See Scanned Report for Details  See Care everywhere Specimen Collected: 12/13/18 Last Resulted: 12/13/18 Received From: [facility]  Result Received: 05/27/22 11:49 AM

## 2023-07-05 NOTE — PROGRESS NOTES
"  Assessment & Plan     Hypophosphatemia  Fairly occult cause.  Has been on supplement.  May need to increase.  Is not clear to me that GI or nephrology work-up have resulted in a identification of a cause.    Acquired hypothyroidism  Appropriately replaced.  Continue levothyroxine supplementation as currently doing.    Hepatic fibrosis, advanced fibrosis  Stable liver enzyme tests.  Continue plan of care through hepatology.    Dental caries  Patient reports she has a lot more dental work planned.  Discussed using chlorhexidine washes she feels like there is some acute issues now.  - chlorhexidine (PERIDEX) 0.12 % solution  Dispense: 300 mL; Refill: 0    Patient will need primary care follow-up.  Due for Pap smear and numerous vaccines.  Understandably, as she is traveling for the  she did not want to proceed with vaccines today.  Will recommend follow-up.    BMI:   Estimated body mass index is 33.59 kg/m  as calculated from the following:    Height as of this encounter: 1.68 m (5' 6.14\").    Weight as of this encounter: 94.8 kg (209 lb).     Papa Cisse MD  [facility]    Lupe Macias is a 43 year old, presenting for the following health issues:  Numbness and thyriod check         2023     7:59 AM   Additional Questions   Roomed by M   Accompanied by self     Numbness  Associated symptoms include numbness.   History of Present Illness       Reason for visit:  Fatigue weakness anxiety    She eats 0-1 servings of fruits and vegetables daily.She consumes 1 sweetened beverage(s) daily.She exercises with enough effort to increase her heart rate 60 or more minutes per day.  She exercises with enough effort to increase her heart rate 7 days per week. She is missing 1 dose(s) of medications per week.  She is not taking prescribed medications regularly due to other.     43-year-old new patient to this clinic but has been seen in the system previously.  Also receiving care at " "[facility].  Patient with concerns of fatigue, weakness, and low energy.  Feels the numbness in her face.  Feels her feet sweating.  She is typically have these symptoms previously when phosphorus has been low.  She had some work-up through endocrinology and nephrology for this.  She is been on a phosphate supplement.    Relates that she is driving to [place] to attend her brother's .    Has some jaw pain on right.  Notes broken teeth.    Review of Systems   Neurological: Positive for numbness.          Objective    /82 (BP Location: Left arm, Patient Position: Sitting, Cuff Size: Adult Large)   Pulse 68   Temp 97.7  F (36.5  C) (Temporal)   Resp 21   Ht 1.68 m (5' 6.14\")   Wt 94.8 kg (209 lb)   LMP 2023   SpO2 99%   Breastfeeding No   BMI 33.59 kg/m    Body mass index is 33.59 kg/m .  Physical Exam   GENERAL: alert, not distressed  EYES: PERRL/EOMI, no scleral icterus, no conjunctival injection  EARS: normal tympanic membranes and external auditory canals bilaterally  PHARYNX: no erythema or exudates  MOUTH: well hydrated mucosa, no lesions  NECK: no lymphadenopathy or thyroid nodules   CHEST: clear, no rales, rhonchi, or wheezes  CARDIAC: regular without murmur, gallop, or rub    Results for orders placed or performed in visit on 23   CBC with platelets     Status: Abnormal   Result Value Ref Range    WBC Count 3.8 (L) 4.0 - 11.0 10e3/uL    RBC Count 5.05 3.80 - 5.20 10e6/uL    Hemoglobin 12.9 11.7 - 15.7 g/dL    Hematocrit 41.2 35.0 - 47.0 %    MCV 82 78 - 100 fL    MCH 25.5 (L) 26.5 - 33.0 pg    MCHC 31.3 (L) 31.5 - 36.5 g/dL    RDW 15.3 (H) 10.0 - 15.0 %    Platelet Count 178 150 - 450 10e3/uL   Ferritin     Status: Normal   Result Value Ref Range    Ferritin 15 6 - 175 ng/mL   Basic metabolic panel  (Ca, Cl, CO2, Creat, Gluc, K, Na, BUN)     Status: Abnormal   Result Value Ref Range    Sodium 140 136 - 145 mmol/L    Potassium 3.6 3.4 - 5.3 mmol/L    Chloride 104 98 - 107 mmol/L    " Carbon Dioxide (CO2) 25 22 - 29 mmol/L    Anion Gap 11 7 - 15 mmol/L    Urea Nitrogen 8.2 6.0 - 20.0 mg/dL    Creatinine 0.71 0.51 - 0.95 mg/dL    Calcium 9.3 8.6 - 10.0 mg/dL    Glucose 126 (H) 70 - 99 mg/dL    GFR Estimate >90 >60 mL/min/1.73m2   Phosphorus     Status: Abnormal   Result Value Ref Range    Phosphorus 2.0 (L) 2.5 - 4.5 mg/dL   Hepatic panel (Albumin, ALT, AST, Bili, Alk Phos, TP)     Status: Abnormal   Result Value Ref Range    Protein Total 7.8 6.4 - 8.3 g/dL    Albumin 4.3 3.5 - 5.2 g/dL    Bilirubin Total 0.3 <=1.2 mg/dL    Alkaline Phosphatase 104 35 - 104 U/L    AST 56 (H) 0 - 45 U/L    ALT 23 0 - 50 U/L    Bilirubin Direct <0.20 0.00 - 0.30 mg/dL   Vitamin D Deficiency     Status: Normal   Result Value Ref Range    Vitamin D, Total (25-Hydroxy) 26 20 - 75 ug/L    Narrative    Season, race, dietary intake, and treatment affect the concentration of 25-hydroxy-Vitamin D. Values may decrease during winter months and increase during summer months. Values 20-29 ug/L may indicate Vitamin D insufficiency and values <20 ug/L may indicate Vitamin D deficiency.    Vitamin D determination is routinely performed by an immunoassay specific for 25 hydroxyvitamin D3.  If an individual is on vitamin D2(ergocalciferol) supplementation, please specify 25 OH vitamin D2 and D3 level determination by LCMSMS test VITD23.     TSH     Status: Normal   Result Value Ref Range    TSH 1.60 0.30 - 4.20 uIU/mL   Iron and iron binding capacity     Status: Abnormal   Result Value Ref Range    Iron 41 37 - 145 ug/dL    Iron Binding Capacity 391 240 - 430 ug/dL    Iron Sat Index 10 (L) 15 - 46 %   Magnesium     Status: Normal   Result Value Ref Range    Magnesium 2.1 1.7 - 2.3 mg/dL   Vitamin B12     Status: Normal   Result Value Ref Range    Vitamin B12 491 232 - 1,245 pg/mL   Parathyroid Hormone Intact     Status: Normal   Result Value Ref Range    Parathyroid Hormone Intact 54 15 - 65 pg/mL    Narrative    This result was  obtained with the Roche Elecsys PTH STAT assay.   This reference range differs from PTH assays used in other [facility] laboratories.            Prior to immunization administration, verified patients identity using patient s name and date of birth. Please see Immunization Activity for additional information.     Screening Questionnaire for Adult Immunization    Are you sick today?   Don't Know   Do you have allergies to medications, food, a vaccine component or latex?   Yes   Have you ever had a serious reaction after receiving a vaccination?   Don't Know   Do you have a long-term health problem with heart, lung, kidney, or metabolic disease (e.g., diabetes), asthma, a blood disorder, no spleen, complement component deficiency, a cochlear implant, or a spinal fluid leak?  Are you on long-term aspirin therapy?   No   Do you have cancer, leukemia, HIV/AIDS, or any other immune system problem?   Don't Know   Do you have a parent, brother, or sister with an immune system problem?   No   In the past 3 months, have you taken medications that affect  your immune system, such as prednisone, other steroids, or anticancer drugs; drugs for the treatment of rheumatoid arthritis, Crohn s disease, or psoriasis; or have you had radiation treatments?   No   Have you had a seizure, or a brain or other nervous system problem?   No   During the past year, have you received a transfusion of blood or blood    products, or been given immune (gamma) globulin or antiviral drug?   No   For women: Are you pregnant or is there a chance you could become       pregnant during the next month?   No   Have you received any vaccinations in the past 4 weeks?   No     Immunization questionnaire was positive for at least one answer.  Notified  .      Patient instructed to remain in clinic for 15 minutes afterwards, and to report any adverse reactions.     Screening performed by MANUELITO Osorio MA on 7/5/2023 at 8:03 AM.

## 2023-07-06 DIAGNOSIS — E03.9 ACQUIRED HYPOTHYROIDISM: ICD-10-CM

## 2023-07-06 RX ORDER — LEVOTHYROXINE SODIUM 88 UG/1
88 TABLET ORAL DAILY
Qty: 90 TABLET | Refills: 3 | OUTPATIENT
Start: 2023-07-06

## 2023-07-06 NOTE — TELEPHONE ENCOUNTER
----- Message from Papa Cisse MD sent at 7/6/2023  3:49 PM CDT -----  Please call and ask which pharmacy should get thyroid med refill.

## 2023-07-06 NOTE — TELEPHONE ENCOUNTER
Routing refill request to provider for review/approval because:  Patient needs to be seen because it has been more than 1 year since last office visit.  Labs have been done but be specialty. Do you want to defer to them?    TSH   Date Value Ref Range Status   07/05/2023 1.60 0.30 - 4.20 uIU/mL Final     Amandeep SEALS RN, BSN

## 2023-07-07 RX ORDER — LEVOTHYROXINE SODIUM 88 UG/1
88 TABLET ORAL DAILY
Qty: 90 TABLET | Refills: 1 | Status: SHIPPED | OUTPATIENT
Start: 2023-07-07 | End: 2024-01-02

## 2023-07-08 NOTE — PATIENT INSTRUCTIONS
Colleen,   You can increase the dose of your phosphate supplement by 1 or 2 capsules/day.  We can repeat the lab test as needed.  I am not sure where your travels will take you or when that will be convenient.  You can let us know.    I do not know if you want to have any more specialist visits to try to identify a cause.  Please let me know if you do and I can make the appropriate referrals.    Pap smear will be due in December.    Dr. Cisse

## 2023-12-30 DIAGNOSIS — E03.9 ACQUIRED HYPOTHYROIDISM: ICD-10-CM

## 2024-01-02 RX ORDER — LEVOTHYROXINE SODIUM 88 UG/1
88 TABLET ORAL DAILY
Qty: 90 TABLET | Refills: 1 | Status: SHIPPED | OUTPATIENT
Start: 2024-01-02 | End: 2024-08-06

## 2024-06-23 ENCOUNTER — HEALTH MAINTENANCE LETTER (OUTPATIENT)
Age: 44
End: 2024-06-23

## 2024-08-06 DIAGNOSIS — E03.9 ACQUIRED HYPOTHYROIDISM: ICD-10-CM

## 2024-08-06 RX ORDER — LEVOTHYROXINE SODIUM 88 UG/1
88 TABLET ORAL DAILY
Qty: 60 TABLET | Refills: 0 | Status: SHIPPED | OUTPATIENT
Start: 2024-08-06

## 2025-03-29 ENCOUNTER — HEALTH MAINTENANCE LETTER (OUTPATIENT)
Age: 45
End: 2025-03-29

## 2025-07-12 ENCOUNTER — HEALTH MAINTENANCE LETTER (OUTPATIENT)
Age: 45
End: 2025-07-12